# Patient Record
Sex: MALE | Race: WHITE | NOT HISPANIC OR LATINO | URBAN - METROPOLITAN AREA
[De-identification: names, ages, dates, MRNs, and addresses within clinical notes are randomized per-mention and may not be internally consistent; named-entity substitution may affect disease eponyms.]

---

## 2019-05-18 ENCOUNTER — EMERGENCY (EMERGENCY)
Facility: HOSPITAL | Age: 24
LOS: 1 days | Discharge: HOME | End: 2019-05-18
Attending: EMERGENCY MEDICINE | Admitting: EMERGENCY MEDICINE
Payer: COMMERCIAL

## 2019-05-18 VITALS
RESPIRATION RATE: 18 BRPM | OXYGEN SATURATION: 98 % | HEART RATE: 66 BPM | HEIGHT: 70.87 IN | WEIGHT: 199.08 LBS | TEMPERATURE: 98 F | SYSTOLIC BLOOD PRESSURE: 145 MMHG | DIASTOLIC BLOOD PRESSURE: 84 MMHG

## 2019-05-18 DIAGNOSIS — M25.522 PAIN IN LEFT ELBOW: ICD-10-CM

## 2019-05-18 PROCEDURE — 73080 X-RAY EXAM OF ELBOW: CPT

## 2019-05-18 PROCEDURE — 99283 EMERGENCY DEPT VISIT LOW MDM: CPT

## 2019-05-18 PROCEDURE — 73080 X-RAY EXAM OF ELBOW: CPT | Mod: 26,LT

## 2019-05-18 RX ORDER — IBUPROFEN 200 MG
600 TABLET ORAL ONCE
Refills: 0 | Status: COMPLETED | OUTPATIENT
Start: 2019-05-18 | End: 2019-05-18

## 2019-05-18 RX ADMIN — Medication 600 MILLIGRAM(S): at 19:50

## 2019-05-18 NOTE — ED ADULT NURSE NOTE - OBJECTIVE STATEMENT
Pt is a 22 y/o male who came in to ED for evaluation of right elbow pain and swelling and difficulty picking up objects s/o skateboard fall yesterday.

## 2019-05-18 NOTE — ED ADULT NURSE NOTE - NSIMPLEMENTINTERV_GEN_ALL_ED
Implemented All Universal Safety Interventions:  [place] to call system. Call bell, personal items and telephone within reach. Instruct patient to call for assistance. Room bathroom lighting operational. Non-slip footwear when patient is off stretcher. Physically safe environment: no spills, clutter or unnecessary equipment. Stretcher in lowest position, wheels locked, appropriate side rails in place.

## 2019-05-18 NOTE — ED PROVIDER NOTE - PHYSICAL EXAMINATION
CONSTITUTIONAL: Well appearing, well nourished, awake, alert and in no apparent distress.  HEENT: Head is atraumatic. Eyes clear bilaterally, normal EOMI. Airway patent.  CARDIAC: Normal rate, regular rhythm.  Heart sounds S1, S2.   RESPIRATORY: Breath sounds clear and equal bilaterally. no tachypnea, respiratory distress.   GASTROINTESTINAL: Abdomen soft, non-tender, no guarding, distension.  MUSCULOSKELETAL: Spine appears normal, no midline spinal tenderness, range of motion is not limited, no muscle or joint tenderness. no bony tenderness.   NEUROLOGICAL: Alert and oriented, no focal deficits, no motor or sensory deficits.  SKIN: Skin normal color for race, warm, dry and intact. No evidence of rash.  PSYCHIATRIC: Alert and oriented to person, place, time/situation. normal mood and affect. no apparent risk to self or others. CONSTITUTIONAL: Well appearing, well nourished, awake, alert and in no apparent distress.  HEENT: Head is atraumatic. Eyes clear bilaterally, normal EOMI. Airway patent.  CARDIAC: Normal rate, regular rhythm.  Heart sounds S1, S2.   RESPIRATORY: Breath sounds clear and equal bilaterally. no tachypnea, respiratory distress.   MUSCULOSKELETAL: mild tenderness to olecranon, no other injury to hand, shoulder, NVI  NEUROLOGICAL: Alert and oriented, no focal deficits, no motor or sensory deficits.  SKIN: Skin normal color for race, warm, dry and intact. No evidence of rash.

## 2019-05-18 NOTE — ED PROVIDER NOTE - OBJECTIVE STATEMENT
24 yo Pt previously healthy with complaints of L elbow pain for one day after falling off skateboard, no head injury, loc, vomiting, assoc paresthesias to LUE, no numbness.

## 2019-05-18 NOTE — ED PROVIDER NOTE - CARE PROVIDER_API CALL
Anselmo Roque)  Orthopaedic Surgery  [address] [place] [address]  Phone: [phone]  Fax: [phone]  Follow Up Time:     Cheng Rocha)  Orthopaedic Surgery  [address], [address] [place] [address]  Phone: [phone]  Fax: [phone]  Follow Up Time:

## 2019-05-18 NOTE — ED ADULT TRIAGE NOTE - CHIEF COMPLAINT QUOTE
c/o left elbow pain since yesterday after landing on his elbow while skateboarding. Denies head injury

## 2019-05-18 NOTE — ED PROVIDER NOTE - NSFOLLOWUPINSTRUCTIONS_ED_ALL_ED_FT
Radial Head Fracture  Image   A radial head fracture is a break in the smaller bone in your forearm (radius). There are two bones in your forearm. The radius, or radial bone, is the bone on the side of your thumb. The fracture is located at the head of the bone, which is at the elbow joint. This usually happens because of an injury, such as falling on an outstretched arm.    There are different types of radial head fractures. They are determined by the amount of movement (displacement) of bones from their normal positions:  Type 1. This is a small fracture in which the bone pieces remain together (nondisplaced fracture).  Type 2. The fracture is moderate, and bone pieces are slightly displaced.  Type 3. There may be multiple fractures and displaced bone pieces.  Often, a dislocated elbow happens at the same time as a radial head fracture.    What are the causes?  This condition is commonly caused by falling and landing on an outstretched arm.    What increases the risk?  This condition is more likely to develop in:  Women.  People who are 30–40 years old.  What are the signs or symptoms?  Symptoms of this condition may include:  Swelling of the elbow joint and pain on the outside of the elbow.  Pain and difficulty when bending or straightening the elbow.  Pain and difficulty when turning the palm of the hand up or down with the elbow bent.  How is this diagnosed?  This condition is diagnosed based on a physical exam and your medical history. You may have X-rays to confirm the type of fracture.    How is this treated?  Treatment for this condition includes resting, icing, and raising (elevating) the injured area above the level of your heart. You may be given medicines to help relieve pain.    Treatment varies depending on the type of fracture you have. If you have a Type 1 fracture, you may be given a splint or sling to keep your arm and elbow from moving (immobilization) for up to 5 days.    If you have a Type 2 fracture, treatment varies depending on how much displacement there is. You may be given a splint or sling to keep your arm and elbow from moving (immobilization) for up to 5 days. If the displacement is more severe, you may need surgery. Surgery may include:  Removing bone pieces.  Putting pins or screws into the head of the radius to hold it in place so it heals correctly.  Removing the entire radial head. The elbow will still be able to function normally.  If you have a Type 3 fracture, you will usually need surgery to have bone pieces removed. The entire radial head may need to be removed if the damage is severe.    Follow these instructions at home:  If you have a splint or sling:     Wear the splint or sling as told by your health care provider. Remove it only as told by your health care provider.  Loosen the splint if your fingers tingle, become numb, or turn cold and blue.  Keep the splint or sling clean and dry.  If you have a cast:     Do not stick anything inside the cast to scratch your skin. Doing that increases your risk of infection.  Check the skin around the cast every day. Tell your health care provider about any concerns.  You may put lotion on dry skin around the edges of the cast. Do not apply lotion to the skin underneath the cast.  Bathing     Do not take baths, swim, or use a hot tub until your health care provider approves. Ask your health care provider if you can take showers. You may only be allowed to take sponge baths for bathing.  If your health care provider approves bathing and showering, you may need to cover the cast or splint with a plastic bag to protect it from water.  Managing pain, stiffness, and swelling     If directed, apply ice to the injured area.  Put ice in a plastic bag.  Place a towel between your skin and the bag.  Leave the ice on for 20 minutes, 2–3 times a day.  Move your fingers often to avoid stiffness and to lessen swelling.  Elevate the injured area above the level of your heart while you are sitting or lying down.  Driving     Do not drive or operate heavy machinery while taking prescription pain medicine.  Ask your health care provider when it is safe to drive if you have a cast, splint, or sling on your arm.  Activity     Return to your normal activities as told by your health care provider. Ask your health care provider what activities are safe for you.  Do exercises as told by your health care provider or physical therapist.  General instructions     Take over-the-counter and prescription medicines only as told by your health care provider.  Do not put pressure on any part of the cast or splint until it is fully hardened. This may take several hours.  Do not use any tobacco products, such as cigarettes, chewing tobacco, and e-cigarettes. Tobacco can delay bone healing. If you need help quitting, ask your health care provider.  Keep all follow-up visits as told by your health care provider. This is important.  Contact a health care provider if:  You have problems with your cast or splint.  You have pain or swelling that gets worse.  Get help right away if:  You have severe pain when you stretch your fingers.  You have fluid or a bad smell coming from your splint.  Your hand or fingers get cold or turn pale or blue.  You lose feeling in any part of your hand or arm.  This information is not intended to replace advice given to you by your health care provider. Make sure you discuss any questions you have with your health care provider.

## 2019-05-18 NOTE — ED PROVIDER NOTE - CLINICAL SUMMARY MEDICAL DECISION MAKING FREE TEXT BOX
Elbow pain concerning for radial head injury given posterior fat pad, no obvious fx, placed in sling, NVI, to matt w ortho.

## 2020-12-10 NOTE — ED ADULT NURSE NOTE - NS ED NURSE DISCH DISPOSITION
Patient would like to speak to a nurse about his PSA, as well as his buproprion medication. He states he would like 90 day script for the medication instead of 30 days. He states the script is already at [facility] but they are charging more so he may decide to use a mail order pharmacy.       Discharged